# Patient Record
Sex: FEMALE | Race: WHITE | NOT HISPANIC OR LATINO
[De-identification: names, ages, dates, MRNs, and addresses within clinical notes are randomized per-mention and may not be internally consistent; named-entity substitution may affect disease eponyms.]

---

## 2021-05-17 ENCOUNTER — RECORDS - HEALTHEAST (OUTPATIENT)
Dept: ADMINISTRATIVE | Facility: OTHER | Age: 56
End: 2021-05-17

## 2021-05-27 ENCOUNTER — RECORDS - HEALTHEAST (OUTPATIENT)
Dept: ADMINISTRATIVE | Facility: OTHER | Age: 56
End: 2021-05-27

## 2021-05-27 ENCOUNTER — RECORDS - HEALTHEAST (OUTPATIENT)
Dept: RADIOLOGY | Facility: CLINIC | Age: 56
End: 2021-05-27

## 2021-06-01 ENCOUNTER — RECORDS - HEALTHEAST (OUTPATIENT)
Dept: ADMINISTRATIVE | Facility: OTHER | Age: 56
End: 2021-06-01

## 2021-06-03 ENCOUNTER — RECORDS - HEALTHEAST (OUTPATIENT)
Dept: RADIOLOGY | Facility: CLINIC | Age: 56
End: 2021-06-03

## 2021-06-03 ENCOUNTER — RECORDS - HEALTHEAST (OUTPATIENT)
Dept: ADMINISTRATIVE | Facility: OTHER | Age: 56
End: 2021-06-03

## 2021-06-08 ENCOUNTER — HOSPITAL ENCOUNTER (OUTPATIENT)
Dept: SURGERY | Facility: CLINIC | Age: 56
Discharge: HOME | End: 2021-06-08
Attending: SPECIALIST

## 2021-06-08 DIAGNOSIS — C50.511 MALIGNANT NEOPLASM OF LOWER-OUTER QUADRANT OF RIGHT BREAST OF FEMALE, ESTROGEN RECEPTOR POSITIVE (H): ICD-10-CM

## 2021-06-08 DIAGNOSIS — Z17.0 MALIGNANT NEOPLASM OF LOWER-OUTER QUADRANT OF RIGHT BREAST OF FEMALE, ESTROGEN RECEPTOR POSITIVE (H): ICD-10-CM

## 2021-06-08 RX ORDER — DULOXETIN HYDROCHLORIDE 30 MG/1
CAPSULE, DELAYED RELEASE ORAL
Status: SHIPPED | COMMUNITY
Start: 2021-04-21

## 2021-06-08 RX ORDER — ATENOLOL 25 MG/1
TABLET ORAL
Status: SHIPPED | COMMUNITY
Start: 2021-06-01

## 2021-06-08 RX ORDER — GABAPENTIN 300 MG/1
CAPSULE ORAL
Status: SHIPPED | COMMUNITY
Start: 2021-06-07

## 2021-06-08 ASSESSMENT — MIFFLIN-ST. JEOR: SCORE: 1264

## 2021-06-26 NOTE — PROGRESS NOTES
Melissa presents to [facility] for a surgical consult with Dr. Pacheco  regarding her newly diagnosed right breast cancer. This is a second opinion consult. She has met with a surgeon in [place].  She is accompanied by her  for consult.  RN assessment and EMR update.  Patient given a Breast Cancer Packet, contents reviewed.  She met with Dr. Pacheco  see dictation for details of visit. She will plan to think over her options and call if she wishes to have her surgery with Dr. Pacheco. Support provided, invited calls.  RN time 15 mins.

## 2021-06-26 NOTE — PROGRESS NOTES
This is a 56 y.o.  female who comes in for second opinion and for evaluation of a right breast cancer.  This was picked up  on screening mammogram.  That she knows it is there, she can feel it.  A needle biopsy was done which shows an invasive lobular carcinoma.  It is estrogen receptor positive, progesterone receptor weakly positive and HER-2 is 2+.  It was sent for FISH and has come back negative.  A suspicious lymph node was also biopsied but that came back negative.    She has no family history of breast cancer.      PAST MEDICAL HISTORY:  No past medical history on file.  No past surgical history on file.    Medications:    Current Outpatient Medications:      amitriptyline (ELAVIL) 25 MG tablet, , Disp: , Rfl:      atenoloL (TENORMIN) 25 MG tablet, , Disp: , Rfl:      cholecalciferol, vitamin D3, 50 mcg (2,000 unit) capsule, Take 2,000 Units by mouth., Disp: , Rfl:      DULoxetine (CYMBALTA) 30 MG capsule, 2 tabs in am, Disp: , Rfl:      gabapentin (NEURONTIN) 300 MG capsule, , Disp: , Rfl:      TURMERIC ORAL, Take 2,000 mg by mouth., Disp: , Rfl:     Allergies:  Allergies   Allergen Reactions     Prednisone Itching     Tetanus And Diphtheria Toxoids      Confusion, body aches.         Social History:  Non-smoker    ROS:  Pertinent items are noted in HPI.    Physical Exam  There were no vitals taken for this visit.  General:alert, appears stated age and cooperative  Lungs:clear to auscultation bilaterally  CV:regular rate and rhythm, S1, S2 normal, no murmur, click, rub or gallop  Breasts: Clearly palpable mass just below and lateral to the right nipple.  Measures 1 to 2 cm in size.  Rather indistinct.  Rather small breasts.  No palpable mass on the left.  Lymph Nodes:no axillary nodes palpated  Neuro:Grossly normal  Musculoskeletal: Normal range of motion of her upper extremities.  Skin: Normal skin turgor.    Reviewed her mammograms and ultrasound and pathology.     Impression: Right Breast Cancer.  Clinically T1-2, N0.  Discussed the surgical options for treatment of breast cancer which generally are a lumpectomy (partial mastectomy) combined with radiation versus a mastectomy.  Explained that the survival benefit is the same for both.  The difference is in local recurrence risk.  The patient is a Reasonable candidate for a lumpectomy.  I did explain that although its not a very large tumor, it is somewhat large compared to the size of her breast and its also in the center of the breast which makes it a little more difficult to remove without changing the contour of the breast.  Also it is a lobular carcinoma and sometimes they are larger than what they appear on imaging.  Reexcision lumpectomy is not uncommon.  I still think it would be reasonable to try.  Discussed SLN biopsy.  The procedure and rationale were explained.  Even though the needle biopsy came back negative on the lymph node I would strongly recommend sentinel lymph node biopsy.  Discussed that at this point we do not know yet whether or not she will need chemotherapy and we may not know until we get all of the results of surgery back.  Explained that an Oncotype is often used to help determine whether or not somebody needs chemotherapy.  I am the patient second opinion and she has already visited with the surgeon in [place].  It sounds like I told them exactly what they were told there and I told her that although I had be happy to do her surgery, I feel that she would be in very reasonable hands in [place].  If she ultimately needed a mastectomy, then I encouraged her to consider going elsewhere as they do not have a plastic surgeon available there so would not be able to offer her immediate reconstruction.      Plan: She is going to consider her options but at this point I think is planning on a lumpectomy in her hometown.  If she changes her mind, she will get back to me.          Medical Decision Making:    Review of external notes as  documented above   Independent interpretation of a test performed by another physician/other qualified health care professional (not separately reported) -reviewed her mammogram, ultrasound and pathology.

## 2021-07-06 VITALS — WEIGHT: 146 LBS | HEIGHT: 66 IN | BODY MASS INDEX: 23.46 KG/M2
